# Patient Record
Sex: FEMALE | Race: WHITE | Employment: FULL TIME | URBAN - METROPOLITAN AREA
[De-identification: names, ages, dates, MRNs, and addresses within clinical notes are randomized per-mention and may not be internally consistent; named-entity substitution may affect disease eponyms.]

---

## 2023-07-13 ENCOUNTER — OFFICE VISIT (OUTPATIENT)
Dept: PRIMARY CARE | Facility: CLINIC | Age: 53
End: 2023-07-13
Payer: COMMERCIAL

## 2023-07-13 VITALS
BODY MASS INDEX: 30.37 KG/M2 | HEART RATE: 89 BPM | SYSTOLIC BLOOD PRESSURE: 115 MMHG | DIASTOLIC BLOOD PRESSURE: 76 MMHG | HEIGHT: 66 IN | OXYGEN SATURATION: 96 % | WEIGHT: 189 LBS

## 2023-07-13 DIAGNOSIS — F17.218 CIGARETTE NICOTINE DEPENDENCE WITH OTHER NICOTINE-INDUCED DISORDER: ICD-10-CM

## 2023-07-13 DIAGNOSIS — F41.1 GENERALIZED ANXIETY DISORDER: ICD-10-CM

## 2023-07-13 DIAGNOSIS — R00.2 PALPITATIONS: Primary | ICD-10-CM

## 2023-07-13 DIAGNOSIS — Z00.00 ANNUAL PHYSICAL EXAM: ICD-10-CM

## 2023-07-13 DIAGNOSIS — E78.49 OTHER HYPERLIPIDEMIA: ICD-10-CM

## 2023-07-13 PROBLEM — R87.610 ASCUS WITH POSITIVE HIGH RISK HPV CERVICAL: Status: ACTIVE | Noted: 2023-07-13

## 2023-07-13 PROBLEM — R87.810 ASCUS WITH POSITIVE HIGH RISK HPV CERVICAL: Status: ACTIVE | Noted: 2023-07-13

## 2023-07-13 PROBLEM — M54.50 LOW BACK PAIN: Status: ACTIVE | Noted: 2023-07-13

## 2023-07-13 PROBLEM — E78.5 HYPERLIPIDEMIA: Status: ACTIVE | Noted: 2023-07-13

## 2023-07-13 PROBLEM — R10.9 RIGHT FLANK PAIN: Status: ACTIVE | Noted: 2023-07-13

## 2023-07-13 PROBLEM — R93.3 ABNORMAL CT SCAN, COLON: Status: ACTIVE | Noted: 2023-07-13

## 2023-07-13 PROBLEM — U07.1 DISEASE DUE TO SEVERE ACUTE RESPIRATORY SYNDROME CORONAVIRUS 2 (SARS-COV-2): Status: ACTIVE | Noted: 2023-07-13

## 2023-07-13 PROBLEM — K59.09 CONSTIPATION, CHRONIC: Status: ACTIVE | Noted: 2023-07-13

## 2023-07-13 PROBLEM — F41.9 ANXIETY DISORDER: Status: ACTIVE | Noted: 2023-07-13

## 2023-07-13 PROBLEM — F17.210 CIGARETTE NICOTINE DEPENDENCE: Status: ACTIVE | Noted: 2023-07-13

## 2023-07-13 PROCEDURE — 99214 OFFICE O/P EST MOD 30 MIN: CPT | Performed by: INTERNAL MEDICINE

## 2023-07-13 RX ORDER — POLYETHYLENE GLYCOL 3350 17 G/17G
POWDER, FOR SOLUTION ORAL 2 TIMES DAILY
COMMUNITY
Start: 2022-06-28

## 2023-07-13 RX ORDER — METOPROLOL SUCCINATE 50 MG/1
50 TABLET, EXTENDED RELEASE ORAL DAILY
COMMUNITY
End: 2023-07-13 | Stop reason: SDUPTHER

## 2023-07-13 RX ORDER — METOPROLOL SUCCINATE 50 MG/1
50 TABLET, EXTENDED RELEASE ORAL DAILY
Qty: 90 TABLET | Refills: 3 | Status: SHIPPED | OUTPATIENT
Start: 2023-07-13

## 2023-07-13 RX ORDER — PAROXETINE HYDROCHLORIDE 20 MG/1
20 TABLET, FILM COATED ORAL DAILY
Qty: 90 TABLET | Refills: 3 | Status: SHIPPED | OUTPATIENT
Start: 2023-07-13

## 2023-07-13 RX ORDER — PAROXETINE HYDROCHLORIDE 20 MG/1
20 TABLET, FILM COATED ORAL DAILY
COMMUNITY
End: 2023-07-13 | Stop reason: SDUPTHER

## 2023-07-13 RX ORDER — SUMATRIPTAN SUCCINATE 100 MG/1
100 TABLET ORAL AS NEEDED
COMMUNITY
Start: 2022-12-12

## 2023-07-13 ASSESSMENT — ENCOUNTER SYMPTOMS
FREQUENCY: 0
FATIGUE: 0
VOMITING: 0
WEAKNESS: 0
DYSURIA: 0
PALPITATIONS: 0
CONSTIPATION: 0
LIGHT-HEADEDNESS: 0
HEMATURIA: 0
DIZZINESS: 0
HEADACHES: 0
DIFFICULTY URINATING: 0
NAUSEA: 0
FEVER: 0
SHORTNESS OF BREATH: 0
CHILLS: 0
SORE THROAT: 0
DIARRHEA: 0
ARTHRALGIAS: 0
MYALGIAS: 0
COUGH: 0

## 2023-07-13 ASSESSMENT — PAIN SCALES - GENERAL: PAINLEVEL: 0-NO PAIN

## 2023-07-13 ASSESSMENT — PATIENT HEALTH QUESTIONNAIRE - PHQ9
SUM OF ALL RESPONSES TO PHQ9 QUESTIONS 1 AND 2: 3
2. FEELING DOWN, DEPRESSED OR HOPELESS: NOT AT ALL
1. LITTLE INTEREST OR PLEASURE IN DOING THINGS: NEARLY EVERY DAY

## 2023-07-13 NOTE — ASSESSMENT & PLAN NOTE
Patient uses metoprolol 50 mg for her palpitations.  Denies any recent changes in frequency or severity of the palpitations.  She notes they are under good control

## 2023-07-13 NOTE — PROGRESS NOTES
Subjective   Patient ID: Sandra Eid is a 53 y.o. female who presents for to monitor her palpitations and general health management.  BN    Patient is here today for routine follow-up on multiple [facility] medical problems including high cholesterol, anxiety and palpitations.  Patient feels stable with no new complaints.        Review of Systems   Constitutional:  Negative for chills, fatigue and fever.   HENT:  Negative for sore throat.    Eyes:  Negative for visual disturbance.   Respiratory:  Negative for cough and shortness of breath.    Cardiovascular:  Negative for chest pain, palpitations and leg swelling.   Gastrointestinal:  Negative for constipation, diarrhea, nausea and vomiting.   Genitourinary:  Negative for difficulty urinating, dysuria, frequency, hematuria and urgency.   Musculoskeletal:  Negative for arthralgias and myalgias.   Skin:  Negative for rash.   Neurological:  Negative for dizziness, syncope, weakness, light-headedness and headaches.       Objective   Medication Documentation Review Audit       Reviewed by Sadie Harris MD (Physician) on 07/13/23 at 1410      Medication Order Taking? Sig Documenting Provider Last Dose Status   metoprolol succinate XL (Toprol-XL) 50 mg 24 hr tablet [id number]  Take 1 tablet (50 mg) by mouth once daily. Historical Provider, MD  Active   PARoxetine (Paxil) 20 mg tablet [id number]  Take 1 tablet (20 mg) by mouth once daily. Historical Provider, MD  Active   polyethylene glycol (Glycolax, Miralax) 17 gram/dose powder [id number] Yes Take by mouth twice a day. Historical Provider, MD  Active   SUMAtriptan (Imitrex) 100 mg tablet [id number] Yes Take 1 tablet (100 mg) by mouth if needed for migraine. take 1 tablet for migraine relief, may repeat 2 hours later. Maximum 200 mg/day Historical Provider, MD  Active                  Physical Exam  Constitutional:       Appearance: Normal appearance.   HENT:      Head: Normocephalic and atraumatic.      Nose: Nose normal.  "  Eyes:      Extraocular Movements: Extraocular movements intact.      Pupils: Pupils are equal, round, and reactive to light.   Cardiovascular:      Rate and Rhythm: Normal rate and regular rhythm.   Pulmonary:      Breath sounds: Normal breath sounds.   Abdominal:      General: Abdomen is flat. Bowel sounds are normal.      Palpations: Abdomen is soft.   Musculoskeletal:      Right lower leg: No edema.      Left lower leg: No edema.   Neurological:      Mental Status: She is alert.     /76 (BP Location: Left arm, Patient Position: Sitting)   Pulse 89   Ht 1.664 m (5' 5.5\")   Wt 85.7 kg (189 lb)   SpO2 96%   BMI 30.97 kg/m²       Assessment/Plan   Problem List Items Addressed This Visit       Anxiety disorder     Patient states anxiety is stable on the paroxetine.  She denies any recent panic attacks or other exacerbations         Relevant Medications    PARoxetine (Paxil) 20 mg tablet    Hyperlipidemia     Patient manages with diet and exercise but she is due for annual blood work in October         Palpitations - Primary     Patient uses metoprolol 50 mg for her palpitations.  Denies any recent changes in frequency or severity of the palpitations.  She notes they are under good control         Relevant Medications    metoprolol succinate XL (Toprol-XL) 50 mg 24 hr tablet    Cigarette nicotine dependence     Patient was encouraged and counseled to quit smoking.  She was offered help but declines it.  She was given the 1 800 quit NOW helpline number and asked to at least call that and talk to somebody about smoking cessation.  Patient admits she likes smoking and never actually trying to quit.          Other Visit Diagnoses       Annual physical exam        Relevant Orders    Lipid Panel    CBC    Comprehensive Metabolic Panel    TSH with reflex to Free T4 if abnormal    Vitamin D 1,25 Dihydroxy                   It has been a pleasure seeing you.    "

## 2023-07-13 NOTE — ASSESSMENT & PLAN NOTE
Patient was encouraged and counseled to quit smoking.  She was offered help but declines it.  She was given the 1 800 quit NOW helpline number and asked to at least call that and talk to somebody about smoking cessation.  Patient admits she likes smoking and never actually trying to quit.

## 2023-07-13 NOTE — PATIENT INSTRUCTIONS
Try calling [phone]- Quit now to help with smoking cesastion.    Get fasting labs in Oct    Follow up Dr Harris in 4 months for anxiety and palpitations

## 2023-07-13 NOTE — ASSESSMENT & PLAN NOTE
Patient states anxiety is stable on the paroxetine.  She denies any recent panic attacks or other exacerbations

## 2023-11-20 ENCOUNTER — OFFICE VISIT (OUTPATIENT)
Dept: PRIMARY CARE | Facility: CLINIC | Age: 53
End: 2023-11-20
Payer: COMMERCIAL

## 2023-11-20 VITALS
BODY MASS INDEX: 30.37 KG/M2 | HEART RATE: 91 BPM | SYSTOLIC BLOOD PRESSURE: 102 MMHG | DIASTOLIC BLOOD PRESSURE: 69 MMHG | WEIGHT: 189 LBS | OXYGEN SATURATION: 98 % | HEIGHT: 66 IN

## 2023-11-20 DIAGNOSIS — F41.9 ANXIETY DISORDER, UNSPECIFIED TYPE: ICD-10-CM

## 2023-11-20 DIAGNOSIS — G56.03 BILATERAL CARPAL TUNNEL SYNDROME: ICD-10-CM

## 2023-11-20 DIAGNOSIS — E78.49 OTHER HYPERLIPIDEMIA: ICD-10-CM

## 2023-11-20 DIAGNOSIS — R00.2 PALPITATIONS: ICD-10-CM

## 2023-11-20 DIAGNOSIS — G44.219 EPISODIC TENSION-TYPE HEADACHE, NOT INTRACTABLE: ICD-10-CM

## 2023-11-20 DIAGNOSIS — Z12.31 SCREENING MAMMOGRAM FOR BREAST CANCER: Primary | ICD-10-CM

## 2023-11-20 PROCEDURE — 99214 OFFICE O/P EST MOD 30 MIN: CPT | Performed by: INTERNAL MEDICINE

## 2023-11-20 ASSESSMENT — ENCOUNTER SYMPTOMS
FREQUENCY: 0
WEAKNESS: 0
CONSTIPATION: 0
SHORTNESS OF BREATH: 0
SORE THROAT: 0
CHILLS: 0
FATIGUE: 0
COUGH: 0
DIZZINESS: 0
HEMATURIA: 0
LIGHT-HEADEDNESS: 0
HEADACHES: 0
VOMITING: 0
FEVER: 0
ARTHRALGIAS: 0
NAUSEA: 0
DIFFICULTY URINATING: 0
DYSURIA: 0
DIARRHEA: 0
MYALGIAS: 0
PALPITATIONS: 0

## 2023-11-20 ASSESSMENT — PATIENT HEALTH QUESTIONNAIRE - PHQ9
1. LITTLE INTEREST OR PLEASURE IN DOING THINGS: NOT AT ALL
SUM OF ALL RESPONSES TO PHQ9 QUESTIONS 1 AND 2: 0
2. FEELING DOWN, DEPRESSED OR HOPELESS: NOT AT ALL

## 2023-11-20 ASSESSMENT — PAIN SCALES - GENERAL: PAINLEVEL: 6

## 2023-11-20 NOTE — ASSESSMENT & PLAN NOTE
Patient has positional numbness in the right and left hand worse on the right side and it is getting worse.  Highly suspicious for carpal tunnel syndrome so we will check nerve conduction/EMGs.  If it is positive we will send her to a hand orthopedic surgeon

## 2023-11-20 NOTE — PROGRESS NOTES
Subjective   Patient ID: Sandra Eid is a 53 y.o. female who presents for 4 month follow up for cholesterol management.  BN    Patient is here for routine 4-month follow-up on generalized anxiety disorder, cholesterol and medication management.  She has noticed a headache for the last few weeks and had 1 at this time last year as well.  She wonders if the headaches could be allergy related  Finally the patient complains of numbness and tingling in her first second and third digits of her right hand sometimes this occurs in the left hand as well.  Strongly suspicious for carpal tunnel syndrome.        Review of Systems   Constitutional:  Negative for chills, fatigue and fever.   HENT:  Negative for sore throat.    Eyes:  Negative for visual disturbance.   Respiratory:  Negative for cough and shortness of breath.    Cardiovascular:  Negative for chest pain, palpitations and leg swelling.   Gastrointestinal:  Negative for constipation, diarrhea, nausea and vomiting.   Genitourinary:  Negative for difficulty urinating, dysuria, frequency, hematuria and urgency.   Musculoskeletal:  Negative for arthralgias and myalgias.   Skin:  Negative for rash.   Neurological:  Negative for dizziness, syncope, weakness, light-headedness and headaches.       Objective   Medication Documentation Review Audit       Reviewed by Sadie Harris MD (Physician) on 11/20/23 at 1328      Medication Order Taking? Sig Documenting Provider Last Dose Status   metoprolol succinate XL (Toprol-XL) 50 mg 24 hr tablet [id number]  Take 1 tablet (50 mg) by mouth once daily. Sadie Harris MD  Active   PARoxetine (Paxil) 20 mg tablet [id number]  Take 1 tablet (20 mg) by mouth once daily. Sadie Harris MD  Active   polyethylene glycol (Glycolax, Miralax) 17 gram/dose powder [id number]  Take by mouth twice a day. Historical Provider, MD  Active   SUMAtriptan (Imitrex) 100 mg tablet [id number]  Take 1 tablet (100 mg) by mouth if needed for migraine. take 1  "tablet for migraine relief, may repeat 2 hours later. Maximum 200 mg/day Historical Provider, MD  Active                  No Known Allergies  Physical Exam  Constitutional:       Appearance: Normal appearance.   HENT:      Head: Normocephalic and atraumatic.      Nose: Nose normal.   Eyes:      Extraocular Movements: Extraocular movements intact.      Pupils: Pupils are equal, round, and reactive to light.   Cardiovascular:      Rate and Rhythm: Normal rate and regular rhythm.   Pulmonary:      Breath sounds: Normal breath sounds.   Abdominal:      General: Abdomen is flat. Bowel sounds are normal.      Palpations: Abdomen is soft.   Musculoskeletal:      Right lower leg: No edema.      Left lower leg: No edema.   Neurological:      Mental Status: She is alert.      Comments: Phalen's, reverse Phalen's were both negative.  Tinel's on the right was positive however.  Tinel's was negative on the left wrist but she does have intermittent symptoms on it as well.     /69 (BP Location: Left arm, Patient Position: Sitting)   Pulse 91   Ht 1.664 m (5' 5.5\")   Wt 85.7 kg (189 lb)   SpO2 98%   BMI 30.97 kg/m²       Assessment/Plan   Problem List Items Addressed This Visit       Anxiety disorder    Hyperlipidemia     Annual blood work is due in July.  Patient remains stable on diet exercise.         Palpitations     Palpitations are controlled with metoprolol.         Bilateral carpal tunnel syndrome     Patient has positional numbness in the right and left hand worse on the right side and it is getting worse.  Highly suspicious for carpal tunnel syndrome so we will check nerve conduction/EMGs.  If it is positive we will send her to a hand orthopedic surgeon         Relevant Orders    EMG & nerve conduction    Episodic tension-type headache, not intractable     Patient has had a headache for several weeks now and had 1 at this time last year.  I am highly suspicious this may be allergic related and have recommended " she try Claritin every day to see if it relieves her symptoms.  If it is not improving she is to let us know or try the sumatriptan hand          Other Visit Diagnoses       Screening mammogram for breast cancer    -  Primary    Relevant Orders    BI mammo bilateral screening tomosynthesis          Patient was asked to get a mammogram which is overdue  Annual labs due in July  Nerve conduction testing for possible carpal tunnel  Follow-up with me will be in 4 to 6 months or as needed         It has been a pleasure seeing you.  Sadie Harris MD

## 2023-11-20 NOTE — PATIENT INSTRUCTIONS
Get mammo after 11/29/23    Follow up Dr Harris in 4 month for HTN etc    Try clariten 10 mg a day

## 2023-11-20 NOTE — ASSESSMENT & PLAN NOTE
Patient has had a headache for several weeks now and had 1 at this time last year.  I am highly suspicious this may be allergic related and have recommended she try Claritin every day to see if it relieves her symptoms.  If it is not improving she is to let us know or try the sumatriptan hand

## 2024-01-15 ENCOUNTER — ANCILLARY PROCEDURE (OUTPATIENT)
Dept: RADIOLOGY | Facility: CLINIC | Age: 54
End: 2024-01-15
Payer: COMMERCIAL

## 2024-01-15 VITALS — HEIGHT: 65 IN | BODY MASS INDEX: 31.65 KG/M2 | WEIGHT: 190 LBS

## 2024-01-15 DIAGNOSIS — Z12.31 SCREENING MAMMOGRAM FOR BREAST CANCER: ICD-10-CM

## 2024-01-15 PROCEDURE — 77063 BREAST TOMOSYNTHESIS BI: CPT | Performed by: RADIOLOGY

## 2024-01-15 PROCEDURE — 77067 SCR MAMMO BI INCL CAD: CPT

## 2024-01-15 PROCEDURE — 77067 SCR MAMMO BI INCL CAD: CPT | Performed by: RADIOLOGY

## 2024-02-19 ENCOUNTER — TELEPHONE (OUTPATIENT)
Dept: PRIMARY CARE | Facility: CLINIC | Age: 54
End: 2024-02-19
Payer: COMMERCIAL

## 2024-02-19 NOTE — TELEPHONE ENCOUNTER
Patient calling for apt. Has had right sided pain for four days. Pain is constant. No fever,no other symptoms. Took  medication for possible constipation, did go, but did not relieve pain. No uti symptoms either. How to address. Did ask patient, she still has appendix.  [phone]

## 2024-03-25 ENCOUNTER — APPOINTMENT (OUTPATIENT)
Dept: PRIMARY CARE | Facility: CLINIC | Age: 54
End: 2024-03-25
Payer: COMMERCIAL

## 2024-04-01 ENCOUNTER — OFFICE VISIT (OUTPATIENT)
Dept: PRIMARY CARE | Facility: CLINIC | Age: 54
End: 2024-04-01
Payer: COMMERCIAL

## 2024-04-01 VITALS
BODY MASS INDEX: 31.49 KG/M2 | HEIGHT: 65 IN | SYSTOLIC BLOOD PRESSURE: 101 MMHG | WEIGHT: 189 LBS | HEART RATE: 70 BPM | OXYGEN SATURATION: 97 % | DIASTOLIC BLOOD PRESSURE: 56 MMHG

## 2024-04-01 DIAGNOSIS — F41.9 ANXIETY DISORDER, UNSPECIFIED TYPE: ICD-10-CM

## 2024-04-01 DIAGNOSIS — F17.218 CIGARETTE NICOTINE DEPENDENCE WITH OTHER NICOTINE-INDUCED DISORDER: ICD-10-CM

## 2024-04-01 DIAGNOSIS — Z00.00 ANNUAL PHYSICAL EXAM: Primary | ICD-10-CM

## 2024-04-01 DIAGNOSIS — R00.2 PALPITATIONS: ICD-10-CM

## 2024-04-01 DIAGNOSIS — E78.49 OTHER HYPERLIPIDEMIA: ICD-10-CM

## 2024-04-01 PROBLEM — U07.1 DISEASE DUE TO SEVERE ACUTE RESPIRATORY SYNDROME CORONAVIRUS 2 (SARS-COV-2): Status: RESOLVED | Noted: 2023-07-13 | Resolved: 2024-04-01

## 2024-04-01 PROBLEM — R93.3 ABNORMAL CT SCAN, COLON: Status: RESOLVED | Noted: 2023-07-13 | Resolved: 2024-04-01

## 2024-04-01 PROBLEM — R10.9 RIGHT FLANK PAIN: Status: RESOLVED | Noted: 2023-07-13 | Resolved: 2024-04-01

## 2024-04-01 PROCEDURE — 99214 OFFICE O/P EST MOD 30 MIN: CPT | Performed by: INTERNAL MEDICINE

## 2024-04-01 ASSESSMENT — ENCOUNTER SYMPTOMS
CONSTIPATION: 0
SHORTNESS OF BREATH: 0
HEMATURIA: 0
DIZZINESS: 0
CHILLS: 0
DIARRHEA: 0
DIFFICULTY URINATING: 0
FATIGUE: 0
COUGH: 0
LIGHT-HEADEDNESS: 0
FEVER: 0
ARTHRALGIAS: 0
NAUSEA: 0
PALPITATIONS: 0
SORE THROAT: 0
HEADACHES: 0
MYALGIAS: 0
WEAKNESS: 0
FREQUENCY: 0
VOMITING: 0
DYSURIA: 0

## 2024-04-01 ASSESSMENT — PAIN SCALES - GENERAL: PAINLEVEL: 0-NO PAIN

## 2024-04-01 ASSESSMENT — PATIENT HEALTH QUESTIONNAIRE - PHQ9
2. FEELING DOWN, DEPRESSED OR HOPELESS: NOT AT ALL
1. LITTLE INTEREST OR PLEASURE IN DOING THINGS: NOT AT ALL
SUM OF ALL RESPONSES TO PHQ9 QUESTIONS 1 AND 2: 0

## 2024-04-01 NOTE — PATIENT INSTRUCTIONS
Please get fasting labs in July    Please quit smoking    Follow up Dr Harris in 6 months with 30 min annual physical

## 2024-04-01 NOTE — ASSESSMENT & PLAN NOTE
Patient states the metoprolol for the palpitations with no complaints.  She denies any recent episodes

## 2024-04-01 NOTE — ASSESSMENT & PLAN NOTE
Patient was offered a referral to the heart and [place] for smoking cessation.  She declines it and says she is not ready to quit.  I also referred her to the 1 800 quit NOW phone line if she decides to change her mind or need help.

## 2024-04-01 NOTE — PROGRESS NOTES
Subjective   Patient ID: Sandra Eid is a 53 y.o. female who presents for 3 month follow up for cholesterol management.    Patient is here for 6-month follow-up for her palpitations, cholesterol anxiety and medication management.  Patient continues to smoke cigarettes and states she feels some congestion in her chest.  I did ask her if she was ready to quit and she said no.  During physical exam she had 1 early squeak which cleared after multiple breaths and no recurrence after that breath sounds were okay.  Patient was counseled about smoking cessation and I did give her the 1 800 quit NOW phone number in an attempt to help her quit smoking.  She admits she is just not ready at this time        Review of Systems   Constitutional:  Negative for chills, fatigue and fever.   HENT:  Negative for sore throat.    Eyes:  Negative for visual disturbance.   Respiratory:  Negative for cough and shortness of breath.    Cardiovascular:  Negative for chest pain, palpitations and leg swelling.   Gastrointestinal:  Negative for constipation, diarrhea, nausea and vomiting.   Genitourinary:  Negative for difficulty urinating, dysuria, frequency, hematuria and urgency.   Musculoskeletal:  Negative for arthralgias and myalgias.   Skin:  Negative for rash.   Neurological:  Negative for dizziness, syncope, weakness, light-headedness and headaches.       Objective   Medication Documentation Review Audit       Reviewed by Sadie Harris MD (Physician) on 04/01/24 at 1550      Medication Order Taking? Sig Documenting Provider Last Dose Status   metoprolol succinate XL (Toprol-XL) 50 mg 24 hr tablet [id number]  Take 1 tablet (50 mg) by mouth once daily. Sadie Harris MD  Active   PARoxetine (Paxil) 20 mg tablet [id number]  Take 1 tablet (20 mg) by mouth once daily. Sadie Harris MD  Active   polyethylene glycol (Glycolax, Miralax) 17 gram/dose powder [id number]  Take by mouth twice a day. Historical Provider, MD  Active   SUMAtriptan  "(Imitrex) 100 mg tablet [id number]  Take 1 tablet (100 mg) by mouth if needed for migraine. take 1 tablet for migraine relief, may repeat 2 hours later. Maximum 200 mg/day Historical Provider, MD  Active                  No Known Allergies  Physical Exam  Constitutional:       Appearance: Normal appearance.   HENT:      Head: Normocephalic and atraumatic.      Nose: Nose normal.   Eyes:      Extraocular Movements: Extraocular movements intact.      Pupils: Pupils are equal, round, and reactive to light.   Cardiovascular:      Rate and Rhythm: Normal rate and regular rhythm.   Pulmonary:      Breath sounds: Normal breath sounds.      Comments: I heard a high-pitched squeak in the right upper posterior aspect of the lungs during her first inspiratory breath.  After that however all the breaths were normal and there was no recurrence of any squeaks or wheezes.  Abdominal:      General: Abdomen is flat. Bowel sounds are normal.      Palpations: Abdomen is soft.   Musculoskeletal:      Right lower leg: No edema.      Left lower leg: No edema.   Neurological:      Mental Status: She is alert.     /56 (BP Location: Left arm, Patient Position: Sitting)   Pulse 70   Ht 1.651 m (5' 5\")   Wt 85.7 kg (189 lb)   SpO2 97%   BMI 31.45 kg/m²       Assessment/Plan   Problem List Items Addressed This Visit       Anxiety disorder     Patient has generalized anxiety disorder which is controlled with her paroxetine 20 mg daily.  She states she feels stable.         Hyperlipidemia     Patient has high cholesterol and it is due for labs in July.  She was given a requisition and we will check it before next appointment         Palpitations     Patient states the metoprolol for the palpitations with no complaints.  She denies any recent episodes         Cigarette nicotine dependence     Patient was offered a referral to the heart and [facility] for smoking cessation.  She declines it and says she is not ready to quit.  I " also referred her to the 1 800 quit NOW phone line if she decides to change her mind or need help.          Other Visit Diagnoses       Annual physical exam    -  Primary    Relevant Orders    Lipid Panel    CBC    Comprehensive Metabolic Panel    TSH with reflex to Free T4 if abnormal    Vitamin D 25-Hydroxy,Total (for eval of Vitamin D levels)                   It has been a pleasure seeing you.  Sadie Harris MD

## 2024-04-01 NOTE — ASSESSMENT & PLAN NOTE
Patient has generalized anxiety disorder which is controlled with her paroxetine 20 mg daily.  She states she feels stable.

## 2024-04-01 NOTE — ASSESSMENT & PLAN NOTE
Patient has high cholesterol and it is due for labs in July.  She was given a requisition and we will check it before next appointment

## 2024-05-23 ENCOUNTER — DOCUMENTATION (OUTPATIENT)
Dept: TRANSPLANT | Facility: HOSPITAL | Age: 54
End: 2024-05-23
Payer: COMMERCIAL

## 2024-05-23 NOTE — PROGRESS NOTES
Referral reviewed. Patient hx and labs completed through [facility] sent to Dr. Rhoades and Dr. Estrada review to determine if patient is acceptable to proceed with evaluation.

## 2024-05-29 ENCOUNTER — TELEPHONE (OUTPATIENT)
Dept: TRANSPLANT | Facility: HOSPITAL | Age: 54
End: 2024-05-29
Payer: COMMERCIAL

## 2024-05-29 NOTE — TELEPHONE ENCOUNTER
"Reviewed referral and lab work with Dr. Estrada  Hx 1PPD x40 yr smoker.  Labs showed:  Creatinine 0.64    eGFR by Creatinine 104  eGFR by Cystatin C 68    Per Dr. Estrada \"I would favor having her complete a 24 hr urine collection for CrCl; we have been having some low CysC that don't always correlate. She would need tobacco cessation before donation.\"  Call placed to Sandra to discuss completing 24 hr CrCl (will include 24 protein and albumin). No answer.  left requesting call back.  Orders to be placed in Epic and 24 hr urine kit to be sent through [facility].       "

## 2024-06-21 ENCOUNTER — TELEPHONE (OUTPATIENT)
Dept: TRANSPLANT | Facility: HOSPITAL | Age: 54
End: 2024-06-21
Payer: COMMERCIAL

## 2024-06-21 NOTE — TELEPHONE ENCOUNTER
Call placed to Sandra to discuss referral and additional testing needed. No answer. VM left requesting call back. This is 2nd attempt

## 2024-07-26 ENCOUNTER — TELEPHONE (OUTPATIENT)
Dept: TRANSPLANT | Facility: HOSPITAL | Age: 54
End: 2024-07-26
Payer: COMMERCIAL

## 2024-09-11 DIAGNOSIS — F41.1 GENERALIZED ANXIETY DISORDER: ICD-10-CM

## 2024-09-11 DIAGNOSIS — R00.2 PALPITATIONS: ICD-10-CM

## 2024-09-11 RX ORDER — METOPROLOL SUCCINATE 50 MG/1
50 TABLET, EXTENDED RELEASE ORAL DAILY
Qty: 90 TABLET | Refills: 0 | Status: SHIPPED | OUTPATIENT
Start: 2024-09-11

## 2024-09-11 RX ORDER — PAROXETINE HYDROCHLORIDE 20 MG/1
20 TABLET, FILM COATED ORAL DAILY
Qty: 90 TABLET | Refills: 0 | Status: SHIPPED | OUTPATIENT
Start: 2024-09-11

## 2024-09-11 NOTE — TELEPHONE ENCOUNTER
Refill    PARoxetine (Paxil) 20 mg tablet  20 mg, Daily           Summary: Take 1 tablet (20 mg) by mouth   once daily.        metoprolol succinate XL (Toprol-XL) 50 mg 24 hr tablet  50 mg, Daily           Summary: Take 1 tablet (50 mg) by mouth   once daily.          [facility] [address]

## 2024-10-01 ENCOUNTER — APPOINTMENT (OUTPATIENT)
Dept: PRIMARY CARE | Facility: CLINIC | Age: 54
End: 2024-10-01
Payer: COMMERCIAL

## 2024-11-04 ENCOUNTER — APPOINTMENT (OUTPATIENT)
Dept: PRIMARY CARE | Facility: CLINIC | Age: 54
End: 2024-11-04
Payer: COMMERCIAL

## 2024-11-04 VITALS
OXYGEN SATURATION: 93 % | DIASTOLIC BLOOD PRESSURE: 71 MMHG | BODY MASS INDEX: 31.62 KG/M2 | HEIGHT: 65 IN | WEIGHT: 189.8 LBS | SYSTOLIC BLOOD PRESSURE: 107 MMHG | HEART RATE: 81 BPM

## 2024-11-04 DIAGNOSIS — Z00.00 ANNUAL PHYSICAL EXAM: ICD-10-CM

## 2024-11-04 DIAGNOSIS — Z12.31 ENCOUNTER FOR SCREENING MAMMOGRAM FOR BREAST CANCER: Primary | ICD-10-CM

## 2024-11-04 DIAGNOSIS — R00.2 PALPITATIONS: ICD-10-CM

## 2024-11-04 DIAGNOSIS — F17.218 CIGARETTE NICOTINE DEPENDENCE WITH OTHER NICOTINE-INDUCED DISORDER: ICD-10-CM

## 2024-11-04 DIAGNOSIS — F41.1 GENERALIZED ANXIETY DISORDER: ICD-10-CM

## 2024-11-04 DIAGNOSIS — E78.49 OTHER HYPERLIPIDEMIA: ICD-10-CM

## 2024-11-04 PROCEDURE — 3008F BODY MASS INDEX DOCD: CPT | Performed by: INTERNAL MEDICINE

## 2024-11-04 PROCEDURE — 99396 PREV VISIT EST AGE 40-64: CPT | Performed by: INTERNAL MEDICINE

## 2024-11-04 PROCEDURE — 88175 CYTOPATH C/V AUTO FLUID REDO: CPT

## 2024-11-04 RX ORDER — METOPROLOL SUCCINATE 50 MG/1
50 TABLET, EXTENDED RELEASE ORAL DAILY
Qty: 90 TABLET | Refills: 3 | Status: SHIPPED | OUTPATIENT
Start: 2024-11-04

## 2024-11-04 RX ORDER — PAROXETINE HYDROCHLORIDE 20 MG/1
20 TABLET, FILM COATED ORAL DAILY
Qty: 90 TABLET | Refills: 3 | Status: SHIPPED | OUTPATIENT
Start: 2024-11-04

## 2024-11-04 ASSESSMENT — ENCOUNTER SYMPTOMS
SHORTNESS OF BREATH: 0
SORE THROAT: 0
ABDOMINAL PAIN: 0
FATIGUE: 0
NAUSEA: 0
VOMITING: 0
LIGHT-HEADEDNESS: 0
DIZZINESS: 0
COUGH: 0
DYSURIA: 0
FREQUENCY: 0
FEVER: 0
ARTHRALGIAS: 0
PALPITATIONS: 0
DIARRHEA: 0
CONSTIPATION: 0
TROUBLE SWALLOWING: 0

## 2024-11-04 ASSESSMENT — PATIENT HEALTH QUESTIONNAIRE - PHQ9
6. FEELING BAD ABOUT YOURSELF - OR THAT YOU ARE A FAILURE OR HAVE LET YOURSELF OR YOUR FAMILY DOWN: NOT AT ALL
3. TROUBLE FALLING OR STAYING ASLEEP OR SLEEPING TOO MUCH: NOT AT ALL
8. MOVING OR SPEAKING SO SLOWLY THAT OTHER PEOPLE COULD HAVE NOTICED. OR THE OPPOSITE, BEING SO FIGETY OR RESTLESS THAT YOU HAVE BEEN MOVING AROUND A LOT MORE THAN USUAL: NOT AT ALL
5. POOR APPETITE OR OVEREATING: NOT AT ALL
SUM OF ALL RESPONSES TO PHQ QUESTIONS 1-9: 7
SUM OF ALL RESPONSES TO PHQ9 QUESTIONS 1 AND 2: 4
7. TROUBLE CONCENTRATING ON THINGS, SUCH AS READING THE NEWSPAPER OR WATCHING TELEVISION: NOT AT ALL
9. THOUGHTS THAT YOU WOULD BE BETTER OFF DEAD, OR OF HURTING YOURSELF: NOT AT ALL
10. IF YOU CHECKED OFF ANY PROBLEMS, HOW DIFFICULT HAVE THESE PROBLEMS MADE IT FOR YOU TO DO YOUR WORK, TAKE CARE OF THINGS AT HOME, OR GET ALONG WITH OTHER PEOPLE: SOMEWHAT DIFFICULT
4. FEELING TIRED OR HAVING LITTLE ENERGY: NEARLY EVERY DAY
1. LITTLE INTEREST OR PLEASURE IN DOING THINGS: NEARLY EVERY DAY
2. FEELING DOWN, DEPRESSED OR HOPELESS: SEVERAL DAYS

## 2024-11-04 ASSESSMENT — PAIN SCALES - GENERAL: PAINLEVEL_OUTOF10: 0-NO PAIN

## 2024-11-04 NOTE — PROGRESS NOTES
Subjective   Patient ID: Sandra Eid is a 54 y.o. female who presents for an annual physical.    Patient is here for her annual physical as well as follow-up on cholesterol anxiety and tension headaches.  Patient continues to smoke and says she is not ready to quit at this time    Patient has a history of ASCUS with HPV positive in 2021.  She followed up with OB/GYN Dr. Benjamin who did further testing and said that it was nothing that she could return to normal testing now.  For this reason a full Pap test was performed today    Patient is due for her GI mammogram in January and was given an order now so she can have it after January 15        Review of Systems   Constitutional:  Negative for fatigue and fever.   HENT:  Negative for sore throat and trouble swallowing.    Eyes:  Negative for visual disturbance.   Respiratory:  Negative for cough and shortness of breath.    Cardiovascular:  Negative for chest pain, palpitations and leg swelling.   Gastrointestinal:  Negative for abdominal pain, constipation, diarrhea, nausea and vomiting.   Genitourinary:  Negative for dysuria and frequency.   Musculoskeletal:  Negative for arthralgias.   Skin:  Negative for rash.   Neurological:  Negative for dizziness and light-headedness.       Objective   Medication Documentation Review Audit       Reviewed by Sadie Harris MD (Physician) on 11/04/24 at 1524      Medication Order Taking? Sig Documenting Provider Last Dose Status   metoprolol succinate XL (Toprol-XL) 50 mg 24 hr tablet [id number]  Take 1 tablet (50 mg) by mouth once daily. Sadie Harris MD  Active   PARoxetine (Paxil) 20 mg tablet [id number]  Take 1 tablet (20 mg) by mouth once daily. Sadie Harris MD  Active   polyethylene glycol (Glycolax, Miralax) 17 gram/dose powder [id number]  Take by mouth twice a day. Historical Provider, MD  Active   SUMAtriptan (Imitrex) 100 mg tablet [id number]  Take 1 tablet (100 mg) by mouth if needed for migraine. take 1 tablet for  "migraine relief, may repeat 2 hours later. Maximum 200 mg/day Historical Provider, MD  Active                  No Known Allergies    /71   Pulse 81   Ht 1.651 m (5' 5\")   Wt 86.1 kg (189 lb 12.8 oz)   SpO2 93%   BMI 31.58 kg/m²     Physical Exam  Constitutional:       Appearance: Normal appearance.   HENT:      Head: Normocephalic and atraumatic.      Nose: Nose normal.   Eyes:      Extraocular Movements: Extraocular movements intact.      Pupils: Pupils are equal, round, and reactive to light.   Neck:      Comments: No thyromegally.  Cardiovascular:      Rate and Rhythm: Normal rate and regular rhythm.   Pulmonary:      Effort: Pulmonary effort is normal.      Breath sounds: Normal breath sounds.      Comments: Breast exam was unremarkable.  Breast showed no nipple discharge skin dimpling or palpable masses.  There is no axillary adenopathy bilaterally.  Abdominal:      General: Abdomen is flat. Bowel sounds are normal.      Palpations: Abdomen is soft.   Genitourinary:     General: Normal vulva.      Comments: Bimanual exam reveals normal vaginal vault, normal-sized uterus and no adnexal fullness or masses in the pelvic area.  Musculoskeletal:         General: Normal range of motion.      Cervical back: Normal range of motion.      Right lower leg: No edema.      Left lower leg: No edema.   Skin:     General: Skin is warm and dry.   Neurological:      General: No focal deficit present.      Mental Status: She is alert.   Psychiatric:         Mood and Affect: Mood normal.         Behavior: Behavior normal.           Assessment/Plan   Problem List Items Addressed This Visit       Anxiety disorder     Patient's mood is stable with the paroxetine 20 mg daily and she was given a refill today.         Relevant Medications    PARoxetine (Paxil) 20 mg tablet    Hyperlipidemia     Patient continues to work on diet and exercise for high cholesterol.  Repeat lipid profile will be obtained in April with her annual " blood work         Palpitations    Relevant Medications    metoprolol succinate XL (Toprol-XL) 50 mg 24 hr tablet    Cigarette nicotine dependence     Patient is not ready to stop smoking at this time.           Annual physical exam     Patient completed annual physical today.  Pap test was obtained and will be sent since she has a history of ASCUS with HPV  Patient given an order for a mammogram which is due in January    I will see her back in 4 months  Annual labs are due in April    Patient was offered but declined flu shot  Patient states she is just not ready to quit smoking at this time          Other Visit Diagnoses       Encounter for screening mammogram for breast cancer    -  Primary    Relevant Orders    BI mammo bilateral screening tomosynthesis                   It has been a pleasure seeing you.  Sadie Harris MD

## 2024-11-04 NOTE — ASSESSMENT & PLAN NOTE
Patient continues to work on diet and exercise for high cholesterol.  Repeat lipid profile will be obtained in April with her annual blood work

## 2024-11-04 NOTE — ASSESSMENT & PLAN NOTE
Patient completed annual physical today.  Pap test was obtained and will be sent since she has a history of ASCUS with HPV  Patient given an order for a mammogram which is due in January    I will see her back in 4 months  Annual labs are due in April    Patient was offered but declined flu shot  Patient states she is just not ready to quit smoking at this time

## 2024-11-15 LAB
CYTOLOGY CMNT CVX/VAG CYTO-IMP: NORMAL
LAB AP HPV GENOTYPE QUESTION: YES
LAB AP HPV HR: NORMAL
LABORATORY COMMENT REPORT: NORMAL
MENSTRUAL HX REPORTED: NORMAL
PATH REPORT.TOTAL CANCER: NORMAL

## 2024-11-18 ENCOUNTER — TELEPHONE (OUTPATIENT)
Dept: PRIMARY CARE | Facility: CLINIC | Age: 54
End: 2024-11-18
Payer: COMMERCIAL

## 2024-11-18 NOTE — TELEPHONE ENCOUNTER
----- Message from Sadie Harris sent at 11/17/2024  6:09 PM EST -----  Please notify patient her pap test showed no evidence of cancer.

## 2025-01-20 ENCOUNTER — HOSPITAL ENCOUNTER (OUTPATIENT)
Dept: RADIOLOGY | Facility: CLINIC | Age: 55
Discharge: HOME | End: 2025-01-20
Payer: COMMERCIAL

## 2025-01-20 VITALS — WEIGHT: 189.82 LBS | BODY MASS INDEX: 31.63 KG/M2 | HEIGHT: 65 IN

## 2025-01-20 DIAGNOSIS — Z12.31 ENCOUNTER FOR SCREENING MAMMOGRAM FOR BREAST CANCER: ICD-10-CM

## 2025-01-20 PROCEDURE — 77063 BREAST TOMOSYNTHESIS BI: CPT | Performed by: STUDENT IN AN ORGANIZED HEALTH CARE EDUCATION/TRAINING PROGRAM

## 2025-01-20 PROCEDURE — 77067 SCR MAMMO BI INCL CAD: CPT

## 2025-01-20 PROCEDURE — 77067 SCR MAMMO BI INCL CAD: CPT | Performed by: STUDENT IN AN ORGANIZED HEALTH CARE EDUCATION/TRAINING PROGRAM

## 2025-01-23 ENCOUNTER — TELEPHONE (OUTPATIENT)
Dept: PRIMARY CARE | Facility: CLINIC | Age: 55
End: 2025-01-23
Payer: COMMERCIAL

## 2025-01-23 NOTE — TELEPHONE ENCOUNTER
----- Message from Sadie Harris sent at 1/23/2025  4:12 PM EST -----  Please notify patient that her mammogram showed no evidence of cancer.  Her next mammogram will be due in 1 year.

## 2025-03-10 ENCOUNTER — APPOINTMENT (OUTPATIENT)
Dept: PRIMARY CARE | Facility: CLINIC | Age: 55
End: 2025-03-10
Payer: COMMERCIAL

## 2025-03-10 VITALS
OXYGEN SATURATION: 94 % | DIASTOLIC BLOOD PRESSURE: 71 MMHG | WEIGHT: 191.2 LBS | HEIGHT: 65 IN | BODY MASS INDEX: 31.86 KG/M2 | HEART RATE: 84 BPM | SYSTOLIC BLOOD PRESSURE: 111 MMHG

## 2025-03-10 DIAGNOSIS — F41.9 ANXIETY DISORDER, UNSPECIFIED TYPE: ICD-10-CM

## 2025-03-10 DIAGNOSIS — L40.9 PSORIASIS: Primary | ICD-10-CM

## 2025-03-10 DIAGNOSIS — E78.49 OTHER HYPERLIPIDEMIA: ICD-10-CM

## 2025-03-10 DIAGNOSIS — Z00.00 ANNUAL PHYSICAL EXAM: ICD-10-CM

## 2025-03-10 PROCEDURE — 99214 OFFICE O/P EST MOD 30 MIN: CPT | Performed by: INTERNAL MEDICINE

## 2025-03-10 PROCEDURE — 3008F BODY MASS INDEX DOCD: CPT | Performed by: INTERNAL MEDICINE

## 2025-03-10 RX ORDER — CLOBETASOL PROPIONATE 0.5 MG/G
CREAM TOPICAL 2 TIMES DAILY
Qty: 45 G | Refills: 2 | Status: SHIPPED | OUTPATIENT
Start: 2025-03-10

## 2025-03-10 ASSESSMENT — ENCOUNTER SYMPTOMS
VOMITING: 0
NAUSEA: 0
ARTHRALGIAS: 0
PALPITATIONS: 0
DIARRHEA: 0
SHORTNESS OF BREATH: 0
TROUBLE SWALLOWING: 0
FATIGUE: 0
DYSURIA: 0
CONSTIPATION: 0
DIZZINESS: 0
FREQUENCY: 0
COUGH: 0
ABDOMINAL PAIN: 0
SORE THROAT: 0
LIGHT-HEADEDNESS: 0
FEVER: 0

## 2025-03-10 ASSESSMENT — PATIENT HEALTH QUESTIONNAIRE - PHQ9
SUM OF ALL RESPONSES TO PHQ QUESTIONS 1-9: 9
8. MOVING OR SPEAKING SO SLOWLY THAT OTHER PEOPLE COULD HAVE NOTICED. OR THE OPPOSITE, BEING SO FIGETY OR RESTLESS THAT YOU HAVE BEEN MOVING AROUND A LOT MORE THAN USUAL: NOT AT ALL
9. THOUGHTS THAT YOU WOULD BE BETTER OFF DEAD, OR OF HURTING YOURSELF: NOT AT ALL
1. LITTLE INTEREST OR PLEASURE IN DOING THINGS: NEARLY EVERY DAY
5. POOR APPETITE OR OVEREATING: NOT AT ALL
2. FEELING DOWN, DEPRESSED OR HOPELESS: NOT AT ALL
6. FEELING BAD ABOUT YOURSELF - OR THAT YOU ARE A FAILURE OR HAVE LET YOURSELF OR YOUR FAMILY DOWN: NOT AT ALL
SUM OF ALL RESPONSES TO PHQ9 QUESTIONS 1 AND 2: 3
4. FEELING TIRED OR HAVING LITTLE ENERGY: NEARLY EVERY DAY
7. TROUBLE CONCENTRATING ON THINGS, SUCH AS READING THE NEWSPAPER OR WATCHING TELEVISION: NOT AT ALL
10. IF YOU CHECKED OFF ANY PROBLEMS, HOW DIFFICULT HAVE THESE PROBLEMS MADE IT FOR YOU TO DO YOUR WORK, TAKE CARE OF THINGS AT HOME, OR GET ALONG WITH OTHER PEOPLE: SOMEWHAT DIFFICULT
3. TROUBLE FALLING OR STAYING ASLEEP OR SLEEPING TOO MUCH: NEARLY EVERY DAY

## 2025-03-10 ASSESSMENT — PAIN SCALES - GENERAL: PAINLEVEL_OUTOF10: 0-NO PAIN

## 2025-03-10 NOTE — ASSESSMENT & PLAN NOTE
Patient has elevated cholesterol in the past but we have not gotten in in over a year.  She was given a requisition including lipid profile.  Once we determine her current levels we will make recommendations

## 2025-03-10 NOTE — ASSESSMENT & PLAN NOTE
Skin rashes consistent with psoriasis in both wrist areas.  Since it is limited we will just give her some clobetasol cream to apply twice a day.  If symptoms do not improve she is to let me know

## 2025-03-10 NOTE — PROGRESS NOTES
Subjective   Patient ID: Sandra Eid is a 54 y.o. female who presents for 4 month follow up for HTN and cholesterol management.    Patient is here for 6-month follow-up for her anxiety disorder palpitations and cholesterol.  She feels well and has no real complaints.  She was post to get blood work last spring and did not so I emphasized the importance of getting blood work this year    Patient did get a mammogram on January 20 of this year  Patient had a colonoscopy in January 2023        Review of Systems   Constitutional:  Negative for fatigue and fever.   HENT:  Negative for sore throat and trouble swallowing.    Eyes:  Negative for visual disturbance.   Respiratory:  Negative for cough and shortness of breath.    Cardiovascular:  Negative for chest pain, palpitations and leg swelling.   Gastrointestinal:  Negative for abdominal pain, constipation, diarrhea, nausea and vomiting.   Genitourinary:  Negative for dysuria and frequency.   Musculoskeletal:  Negative for arthralgias.   Skin:  Negative for rash.        Patient has a rash red dry flaking rash very plaque-like on both wrists on the palmar surface.  She says it has been there for years if not weeks or months but has become worse this past winter.  Rash is consistent with plaque-like psoriasis.   Neurological:  Negative for dizziness and light-headedness.       Objective   Medication Documentation Review Audit       Reviewed by Sadie Harris MD (Physician) on 03/10/25 at 1509      Medication Order Taking? Sig Documenting Provider Last Dose Status   metoprolol succinate XL (Toprol-XL) 50 mg 24 hr tablet [id number] Yes Take 1 tablet (50 mg) by mouth once daily. Sadie Harris MD  Active   PARoxetine (Paxil) 20 mg tablet [id number] Yes Take 1 tablet (20 mg) by mouth once daily. Sadie Harris MD  Active   polyethylene glycol (Glycolax, Miralax) 17 gram/dose powder [id number]  Take by mouth twice a day. Historical Provider, MD  Active   SUMAtriptan  "(Imitrex) 100 mg tablet [id number]  Take 1 tablet (100 mg) by mouth if needed for migraine. take 1 tablet for migraine relief, may repeat 2 hours later. Maximum 200 mg/day   Patient not taking: Reported on 3/10/2025    Historical Provider, MD  Active                  No Known Allergies    /71   Pulse 84   Ht 1.651 m (5' 5\")   Wt 86.7 kg (191 lb 3.2 oz)   SpO2 94%   BMI 31.82 kg/m²     Physical Exam  Constitutional:       Appearance: Normal appearance.   HENT:      Head: Normocephalic and atraumatic.      Nose: Nose normal.   Eyes:      Extraocular Movements: Extraocular movements intact.      Pupils: Pupils are equal, round, and reactive to light.   Cardiovascular:      Rate and Rhythm: Normal rate and regular rhythm.   Pulmonary:      Breath sounds: Normal breath sounds.   Abdominal:      General: Abdomen is flat. Bowel sounds are normal.      Palpations: Abdomen is soft.   Musculoskeletal:      Right lower leg: No edema.      Left lower leg: No edema.   Skin:     Comments: Patient has small patch like psoriasis dry flaking thickened plaques on both wrists.  The 1 on the right wrist is slightly worse compared to the left wrist which is more on the ulnar side.   Neurological:      Mental Status: She is alert.           Assessment/Plan   Problem List Items Addressed This Visit       Anxiety disorder     Patient notes her anxiety is stable on the Paxil or paroxetine 20 mg daily.         Hyperlipidemia     Patient has elevated cholesterol in the past but we have not gotten in in over a year.  She was given a requisition including lipid profile.  Once we determine her current levels we will make recommendations         Annual physical exam    Relevant Orders    Lipid Panel    CBC    Comprehensive Metabolic Panel    TSH with reflex to Free T4 if abnormal    Vitamin D 25-Hydroxy,Total (for eval of Vitamin D levels)    Psoriasis - Primary     Skin rashes consistent with psoriasis in both wrist areas.  Since it is " limited we will just give her some clobetasol cream to apply twice a day.  If symptoms do not improve she is to let me know         Relevant Medications    clobetasol (Temovate) 0.05 % cream              It has been a pleasure seeing you.  Sadie Harris MD

## 2025-04-19 ENCOUNTER — OFFICE VISIT (OUTPATIENT)
Dept: URGENT CARE | Age: 55
End: 2025-04-19
Payer: COMMERCIAL

## 2025-04-19 ENCOUNTER — ANCILLARY PROCEDURE (OUTPATIENT)
Dept: URGENT CARE | Age: 55
End: 2025-04-19
Payer: COMMERCIAL

## 2025-04-19 VITALS
DIASTOLIC BLOOD PRESSURE: 75 MMHG | WEIGHT: 180 LBS | OXYGEN SATURATION: 95 % | RESPIRATION RATE: 18 BRPM | SYSTOLIC BLOOD PRESSURE: 126 MMHG | HEIGHT: 66 IN | BODY MASS INDEX: 28.93 KG/M2 | HEART RATE: 84 BPM | TEMPERATURE: 96.5 F

## 2025-04-19 DIAGNOSIS — T36.95XA ANTIBIOTIC-INDUCED YEAST INFECTION: ICD-10-CM

## 2025-04-19 DIAGNOSIS — F17.200 CURRENT EVERY DAY SMOKER: ICD-10-CM

## 2025-04-19 DIAGNOSIS — R05.9 COUGH IN ADULT PATIENT: ICD-10-CM

## 2025-04-19 DIAGNOSIS — J40 BRONCHITIS: Primary | ICD-10-CM

## 2025-04-19 DIAGNOSIS — B37.9 ANTIBIOTIC-INDUCED YEAST INFECTION: ICD-10-CM

## 2025-04-19 PROCEDURE — 71046 X-RAY EXAM CHEST 2 VIEWS: CPT

## 2025-04-19 RX ORDER — INHALER, ASSIST DEVICES
SPACER (EA) MISCELLANEOUS
Qty: 1 EACH | Refills: 0 | Status: SHIPPED | OUTPATIENT
Start: 2025-04-19 | End: 2026-04-19

## 2025-04-19 RX ORDER — FLUCONAZOLE 150 MG/1
150 TABLET ORAL ONCE
Qty: 1 TABLET | Refills: 0 | Status: SHIPPED | OUTPATIENT
Start: 2025-04-19 | End: 2025-04-19

## 2025-04-19 RX ORDER — AZITHROMYCIN 250 MG/1
TABLET, FILM COATED ORAL
Qty: 6 TABLET | Refills: 0 | Status: SHIPPED | OUTPATIENT
Start: 2025-04-19

## 2025-04-19 RX ORDER — ALBUTEROL SULFATE 90 UG/1
2 INHALANT RESPIRATORY (INHALATION) EVERY 6 HOURS PRN
Qty: 18 G | Refills: 0 | Status: SHIPPED | OUTPATIENT
Start: 2025-04-19 | End: 2026-04-19

## 2025-04-19 RX ORDER — PREDNISONE 20 MG/1
40 TABLET ORAL DAILY
Qty: 10 TABLET | Refills: 0 | Status: SHIPPED | OUTPATIENT
Start: 2025-04-19 | End: 2025-04-24

## 2025-04-19 ASSESSMENT — ENCOUNTER SYMPTOMS
LIGHT-HEADEDNESS: 0
WEIGHT LOSS: 0
COUGH: 1
DIZZINESS: 0
SHORTNESS OF BREATH: 1
MYALGIAS: 0
SORE THROAT: 0
HEADACHES: 0
WHEEZING: 1
FEVER: 0
SINUS PRESSURE: 0
NAUSEA: 0
SWEATS: 0
VOMITING: 0
HEARTBURN: 0
RHINORRHEA: 0
CHILLS: 0
FATIGUE: 0
SINUS PAIN: 0
HEMOPTYSIS: 0
DIARRHEA: 0

## 2025-04-19 ASSESSMENT — PAIN SCALES - GENERAL: PAINLEVEL_OUTOF10: 6

## 2025-04-19 ASSESSMENT — COPD QUESTIONNAIRES: COPD: 0

## 2025-04-19 NOTE — PROGRESS NOTES
Subjective   Patient ID: Sandra Eid is a 54 y.o. female. They present today with a chief complaint of Cough (Cough for 2 weeks does not want testing. + smoker. 6/10 pain. ).    History of Present Illness  54 year old female presents with complaint of cough, SOB, wheezing, rib pain. Symptoms started 2 wks ago.       Cough  This is a new problem. The current episode started 1 to 4 weeks ago. The problem has been gradually worsening. The problem occurs every few minutes. The cough is Non-productive. Associated symptoms include shortness of breath and wheezing. Pertinent negatives include no chest pain, chills, ear congestion, ear pain, fever, headaches, heartburn, hemoptysis, myalgias, nasal congestion, postnasal drip, rash, rhinorrhea, sore throat, sweats or weight loss. The symptoms are aggravated by lying down and exercise. She has tried OTC cough suppressant for the symptoms. The treatment provided mild relief. There is no history of asthma, bronchiectasis, bronchitis, COPD, emphysema, environmental allergies or pneumonia.       Past Medical History  Allergies as of 04/19/2025   • (No Known Allergies)       Prescriptions Prior to Admission[1]     Medical History[2]    Surgical History[3]     reports that she has been smoking cigarettes. She started smoking about 43 years ago. She has a 43.3 pack-year smoking history. She has never been exposed to tobacco smoke. She has never used smokeless tobacco. She reports that she does not drink alcohol and does not use drugs.    Review of Systems  Review of Systems   Constitutional:  Negative for chills, fatigue, fever and weight loss.   HENT:  Negative for ear pain, postnasal drip, rhinorrhea, sinus pressure, sinus pain and sore throat.    Respiratory:  Positive for cough, shortness of breath and wheezing. Negative for hemoptysis.    Cardiovascular:  Negative for chest pain.   Gastrointestinal:  Negative for diarrhea, heartburn, nausea and vomiting.  "  Musculoskeletal:  Negative for myalgias.   Skin:  Negative for rash.   Allergic/Immunologic: Negative for environmental allergies.   Neurological:  Negative for dizziness, light-headedness and headaches.   All other systems reviewed and are negative.      Dictation #1  MRN:[id number]  CSN:[id number]     Objective    Vitals:    04/19/25 0842   BP: 126/75   BP Location: Right arm   Patient Position: Sitting   BP Cuff Size: Adult   Pulse: 84   Resp: 18   Temp: 35.8 °C (96.5 °F)   TempSrc: Temporal   SpO2: 95%   Weight: 81.6 kg (180 lb)   Height: 1.676 m (5' 6\")     No LMP recorded. Patient is postmenopausal.    Physical Exam  Vitals and nursing note reviewed.   Constitutional:       General: She is not in acute distress.     Appearance: Normal appearance. She is normal weight. She is not ill-appearing or toxic-appearing.   HENT:      Head: Normocephalic.      Right Ear: Tympanic membrane, ear canal and external ear normal.      Left Ear: Tympanic membrane, ear canal and external ear normal.      Nose: Nose normal. No congestion or rhinorrhea.      Mouth/Throat:      Mouth: Mucous membranes are moist.      Pharynx: Oropharynx is clear.   Eyes:      Pupils: Pupils are equal, round, and reactive to light.   Cardiovascular:      Rate and Rhythm: Normal rate and regular rhythm.      Pulses: Normal pulses.      Heart sounds: Normal heart sounds.   Pulmonary:      Effort: Pulmonary effort is normal. No respiratory distress.      Breath sounds: Normal breath sounds. Decreased air movement present. No decreased breath sounds, wheezing or rhonchi.   Musculoskeletal:         General: Normal range of motion.      Cervical back: Normal range of motion and neck supple.   Lymphadenopathy:      Cervical: No cervical adenopathy.   Skin:     General: Skin is warm.   Neurological:      Mental Status: She is alert and oriented to person, place, and time.   Psychiatric:         Mood and Affect: Mood normal.         Behavior: Behavior " normal.       Procedures    Point of Care Test & Imaging Results from this visit  No results found for this visit on 04/19/25.   Imaging  No results found.    Cardiology, Vascular, and Other Imaging  No other imaging results found for the past 2 days      Diagnostic study results (if any) were reviewed by RICK Whaley.    Assessment/Plan   Allergies, medications, history, and pertinent labs/EKGs/Imaging reviewed by RICK Whaley.     Medical Decision Making  History and exam consistent with acute bronchitis. No evidence of pneumonia, sepsis or other acute cardiopulmonary pathology. Based on current exam XRAY is completed in office and negative for pneumonia or other significant pulmonary pathology. Based on current exam and past medical history, plan is for albuterol, steroids, and symptomatic therapies. Patient advised to return to clinic or go to the ED if symptoms change or worsen. Patient verbalized understanding and agreeable with plan of care.       Orders and Diagnoses  Diagnoses and all orders for this visit:  Bronchitis  -     azithromycin (Zithromax) 250 mg tablet; Take 2 tabs (500 mg) by mouth today, then take 1 tab daily for 4 days.  -     albuterol 90 mcg/actuation inhaler; Inhale 2 puffs every 6 hours if needed for wheezing.  -     predniSONE (Deltasone) 20 mg tablet; Take 2 tablets (40 mg) by mouth once daily for 5 days.  -     inhalational spacing device (Aerochamber MV) inhaler; Use as instructed  Cough in adult patient  -     XR chest 2 views; Future  Current every day smoker  Antibiotic-induced yeast infection  -     fluconazole (Diflucan) 150 mg tablet; Take 1 tablet (150 mg) by mouth 1 time for 1 dose.      Medical Admin Record      Patient disposition: Home    Electronically signed by RICK Whaley  9:47 AM             [1]  (Not in a hospital admission)   [2]  Past Medical History:  Diagnosis Date   • Allergic contact dermatitis, unspecified cause  05/02/2017    Allergic dermatitis   • Impaired fasting glucose 04/15/2016    Abnormal fasting glucose   • Menopausal and female climacteric states 01/16/2020    Hot flash, menopausal   • Myalgia, other site 04/02/2015    Muscular abdominal pain in left flank   • Other fracture of left lower leg, subsequent encounter for closed fracture with routine healing 11/16/2020    Closed fracture of left ankle with routine healing, subsequent encounter   • Personal history of diseases of the skin and subcutaneous tissue 05/11/2018    History of contact dermatitis   • Personal history of diseases of the skin and subcutaneous tissue 04/02/2015    History of urticaria   • Personal history of other diseases of the digestive system     History of hemorrhoids   • Personal history of other infectious and parasitic diseases 05/02/2017    History of tinea corporis   • Personal history of other specified conditions 04/02/2015    History of left flank pain   • Personal history of urinary (tract) infections 09/01/2019    History of urinary tract infection   • Rash and other nonspecific skin eruption 04/22/2016    Rash   • Strain of unspecified muscle and tendon at ankle and foot level, left foot, initial encounter 07/07/2020    Strain of left ankle, initial encounter   [3]  Past Surgical History:  Procedure Laterality Date   • WISDOM TOOTH EXTRACTION

## 2025-05-02 ENCOUNTER — TELEPHONE (OUTPATIENT)
Dept: PRIMARY CARE | Facility: CLINIC | Age: 55
End: 2025-05-02
Payer: COMMERCIAL

## 2025-05-02 NOTE — TELEPHONE ENCOUNTER
Patient had covid for about a month. Is done with symptoms and cough, however she is still experiencing rib pain that will not go away. Has been feeling it for a week now without coughing. How to address situation.

## 2025-05-05 NOTE — TELEPHONE ENCOUNTER
Patient LMOM asking to come in   She says she is not feeling any better at all  Can I put her on today's same day sick?    [phone]

## 2025-05-05 NOTE — TELEPHONE ENCOUNTER
Patient called and is complaining of chest and rib pain for about two weeks.  She states she called earlier.  I found a 15 minute appointment on Friday May 9th but patient states she will be out of town.

## 2025-08-12 ENCOUNTER — TELEPHONE (OUTPATIENT)
Dept: PRIMARY CARE | Facility: CLINIC | Age: 55
End: 2025-08-12
Payer: COMMERCIAL

## 2025-09-09 ENCOUNTER — APPOINTMENT (OUTPATIENT)
Dept: PRIMARY CARE | Facility: CLINIC | Age: 55
End: 2025-09-09
Payer: COMMERCIAL